# Patient Record
Sex: FEMALE | Race: WHITE | NOT HISPANIC OR LATINO | Employment: OTHER | URBAN - METROPOLITAN AREA
[De-identification: names, ages, dates, MRNs, and addresses within clinical notes are randomized per-mention and may not be internally consistent; named-entity substitution may affect disease eponyms.]

---

## 2017-04-10 ENCOUNTER — HOSPITAL ENCOUNTER (EMERGENCY)
Facility: CLINIC | Age: 67
Discharge: HOME | End: 2017-04-10
Attending: EMERGENCY MEDICINE | Admitting: EMERGENCY MEDICINE
Payer: MEDICARE

## 2017-04-10 VITALS
RESPIRATION RATE: 16 BRPM | OXYGEN SATURATION: 98 % | DIASTOLIC BLOOD PRESSURE: 93 MMHG | SYSTOLIC BLOOD PRESSURE: 178 MMHG | TEMPERATURE: 98.3 F

## 2017-04-10 DIAGNOSIS — R00.2 PALPITATIONS: ICD-10-CM

## 2017-04-10 DIAGNOSIS — N30.00 ACUTE CYSTITIS WITHOUT HEMATURIA: ICD-10-CM

## 2017-04-10 LAB
ALBUMIN UR-MCNC: NEGATIVE MG/DL
ANION GAP SERPL CALCULATED.3IONS-SCNC: 5 MMOL/L (ref 3–14)
APPEARANCE UR: ABNORMAL
BACTERIA #/AREA URNS HPF: ABNORMAL /HPF
BASOPHILS # BLD AUTO: 0 10E9/L (ref 0–0.2)
BASOPHILS NFR BLD AUTO: 0.8 %
BILIRUB UR QL STRIP: NEGATIVE
BUN SERPL-MCNC: 11 MG/DL (ref 7–30)
CALCIUM SERPL-MCNC: 8.6 MG/DL (ref 8.5–10.1)
CHLORIDE SERPL-SCNC: 107 MMOL/L (ref 94–109)
CO2 SERPL-SCNC: 32 MMOL/L (ref 20–32)
COLOR UR AUTO: YELLOW
CREAT SERPL-MCNC: 0.58 MG/DL (ref 0.52–1.04)
DIFFERENTIAL METHOD BLD: NORMAL
EOSINOPHIL # BLD AUTO: 0.1 10E9/L (ref 0–0.7)
EOSINOPHIL NFR BLD AUTO: 1 %
ERYTHROCYTE [DISTWIDTH] IN BLOOD BY AUTOMATED COUNT: 13 % (ref 10–15)
GFR SERPL CREATININE-BSD FRML MDRD: ABNORMAL ML/MIN/1.7M2
GLUCOSE SERPL-MCNC: 71 MG/DL (ref 70–99)
GLUCOSE UR STRIP-MCNC: NEGATIVE MG/DL
HCT VFR BLD AUTO: 39.7 % (ref 35–47)
HGB BLD-MCNC: 13.6 G/DL (ref 11.7–15.7)
HGB UR QL STRIP: ABNORMAL
HYALINE CASTS #/AREA URNS LPF: 1 /LPF (ref 0–2)
IMM GRANULOCYTES # BLD: 0 10E9/L (ref 0–0.4)
IMM GRANULOCYTES NFR BLD: 0 %
INTERPRETATION ECG - MUSE: NORMAL
KETONES UR STRIP-MCNC: NEGATIVE MG/DL
LEUKOCYTE ESTERASE UR QL STRIP: ABNORMAL
LYMPHOCYTES # BLD AUTO: 1.1 10E9/L (ref 0.8–5.3)
LYMPHOCYTES NFR BLD AUTO: 21.3 %
MAGNESIUM SERPL-MCNC: 2.1 MG/DL (ref 1.6–2.3)
MCH RBC QN AUTO: 30.9 PG (ref 26.5–33)
MCHC RBC AUTO-ENTMCNC: 34.3 G/DL (ref 31.5–36.5)
MCV RBC AUTO: 90 FL (ref 78–100)
MONOCYTES # BLD AUTO: 0.4 10E9/L (ref 0–1.3)
MONOCYTES NFR BLD AUTO: 6.9 %
MUCOUS THREADS #/AREA URNS LPF: PRESENT /LPF
NEUTROPHILS # BLD AUTO: 3.6 10E9/L (ref 1.6–8.3)
NEUTROPHILS NFR BLD AUTO: 70 %
NITRATE UR QL: NEGATIVE
NRBC # BLD AUTO: 0 10*3/UL
NRBC BLD AUTO-RTO: 0 /100
PH UR STRIP: 5 PH (ref 5–7)
PLATELET # BLD AUTO: 229 10E9/L (ref 150–450)
POTASSIUM SERPL-SCNC: 3.2 MMOL/L (ref 3.4–5.3)
RBC # BLD AUTO: 4.4 10E12/L (ref 3.8–5.2)
RBC #/AREA URNS AUTO: 3 /HPF (ref 0–2)
SODIUM SERPL-SCNC: 144 MMOL/L (ref 133–144)
SP GR UR STRIP: 1.01 (ref 1–1.03)
SQUAMOUS #/AREA URNS AUTO: 2 /HPF (ref 0–1)
T4 FREE SERPL-MCNC: 1.13 NG/DL (ref 0.76–1.46)
TSH SERPL DL<=0.005 MIU/L-ACNC: 0.22 MU/L (ref 0.4–4)
URN SPEC COLLECT METH UR: ABNORMAL
UROBILINOGEN UR STRIP-MCNC: 0 MG/DL (ref 0–2)
WBC # BLD AUTO: 5.1 10E9/L (ref 4–11)
WBC #/AREA URNS AUTO: 11 /HPF (ref 0–2)

## 2017-04-10 PROCEDURE — 85025 COMPLETE CBC W/AUTO DIFF WBC: CPT | Performed by: EMERGENCY MEDICINE

## 2017-04-10 PROCEDURE — 80048 BASIC METABOLIC PNL TOTAL CA: CPT | Performed by: EMERGENCY MEDICINE

## 2017-04-10 PROCEDURE — 84439 ASSAY OF FREE THYROXINE: CPT | Performed by: EMERGENCY MEDICINE

## 2017-04-10 PROCEDURE — 99284 EMERGENCY DEPT VISIT MOD MDM: CPT

## 2017-04-10 PROCEDURE — 83735 ASSAY OF MAGNESIUM: CPT | Performed by: EMERGENCY MEDICINE

## 2017-04-10 PROCEDURE — 36415 COLL VENOUS BLD VENIPUNCTURE: CPT | Performed by: EMERGENCY MEDICINE

## 2017-04-10 PROCEDURE — 81001 URINALYSIS AUTO W/SCOPE: CPT | Performed by: EMERGENCY MEDICINE

## 2017-04-10 PROCEDURE — 93005 ELECTROCARDIOGRAM TRACING: CPT

## 2017-04-10 PROCEDURE — 87086 URINE CULTURE/COLONY COUNT: CPT | Performed by: EMERGENCY MEDICINE

## 2017-04-10 PROCEDURE — 84443 ASSAY THYROID STIM HORMONE: CPT | Performed by: EMERGENCY MEDICINE

## 2017-04-10 RX ORDER — SULFAMETHOXAZOLE/TRIMETHOPRIM 800-160 MG
1 TABLET ORAL 2 TIMES DAILY
Qty: 6 TABLET | Refills: 0 | Status: SHIPPED | OUTPATIENT
Start: 2017-04-10 | End: 2017-04-13

## 2017-04-10 ASSESSMENT — ENCOUNTER SYMPTOMS
FREQUENCY: 1
PALPITATIONS: 1

## 2017-04-10 NOTE — ED PROVIDER NOTES
"  History     Chief Complaint:    Palpitations      HPI   Sharona Hardwick is a 66 year old female with a history, CAD, hypertension, hyperlipidemia, anxiety with somatization disorder (with frequent ER visits and workup for symptoms attributed to her anxiety) who presents with palpitations. The patient has had a Holter monitor as recently as 7 years ago. She states that she was seen by cardiology a couple weeks ago as she reports that she has had instances when her \"heart stops\" and she \"cannot feel a pulse\". She states that cardiology \"did not do anything\" on that visit and she returned home. This has been present for the last month, and she states that it has happened more frequently after her cardiology visit, especially after she smokes a cigarette.  Denies any other associated symptoms including lightheadedness, near-syncope or syncope.  The patient also reports that she has had increased urinary frequency and that her urine feels \"cold\". She has not had any changes in her medications recently and states that she takes her current regimen as directed.      Cardiac Risk Factors   Sex: Male   Tobacco: Positive  Hypertension: Positive   Diabetes: Negative  Hyperlipidemia: Positive   Family History: Positive    Allergies:  Albuterol   Beta Adrenergic Blockers  Nitrofurantoin  Spironolactone  Ciprofloxacin  Sulfamethoxazole W/Trimethoprim  Tavist-D [[employer] Dayhist-ROB]    Medications:    Aspirin 81 mg tablet  Sennosides (senokot) 8.6 mg tablet  Docusate sodium (colace) 100 mg capsule  Lorazepam po  Vitamin d, cholecalciferol, po  Nitroglycerin (nitrostat) 0.4 mg sl tablet  Oxycodone (roxicodone) 15 mg immediate release tablet  Lisinopril (prinivil,zestril) 20 mg tablet  Simvastatin (zocor) 20 mg tablet    Past Medical History:    Anxiety with somatization  Chronic kidney disease  Coronary artery disease   Colon polyps   COPD (chronic obstructive pulmonary disease) (H)   Coronary artery disease   HTN " "(hypertension)   Hyperlipidemia LDL goal < 70   Neuropathy (H)   JONH (obstructive sleep apnea)   Peripheral vascular disease (H)   Urinary retention     Past Surgical History:    GI surgery   GYN surgery   Orthopedic surgery    Family History:    CAD - Father, Sister   Hypertension - Father   Cerebrovascular disease - Father   Prostate cancer - Father, Brother    Obesity - Brother   Diabetes - Sister     Social History:  Marital Status:   Presents to the ED alone  Tobacco Use: Current everyday smoker  Alcohol Use: No  PCP: Ele Gerard MD      Review of Systems   Cardiovascular: Positive for palpitations.   Genitourinary: Positive for frequency.        Positive for \"cold\" urine.    All other systems reviewed and are negative.        Physical Exam   First Vitals:  BP: (!) 178/93  Heart Rate: 61  Temp: 98.3  F (36.8  C)  Resp: 16  SpO2: 98 %    Physical Exam    VITAL SIGNS: BP (!) 178/93  Temp 98.3  F (36.8  C) (Temporal)  Resp 16  SpO2 98%   Constitutional:  Well developed, Well nourished, Anxious   HENT:  Bilateral external ears normal, Mucous membranes moist, Nose normal. Neck- Normal range of motion, Supple  Respiratory:  Normal breath sounds, No respiratory distress, No wheezing,  Cardiovascular:  Mild bradycardia with heart rate in the 60's, Normal rhythm, No murmurs,    Musculoskeletal:  Intact distal pulses, No edema, grossly unremarkable range of motion   Integument:  Warm, Dry   Neurologic:  Alert, attentive and appropriately oriented  Psychiatric:  Mood and affect anxious      Emergency Department Course   ECG:  @ 1158  Indication: palpitations  Vent. Rate 51 bpm. AL interval 164 ms. QRS duration 88 ms. QT/QTc 460/423 ms. P-R-T axis 80 18 59.   Sinus bradycardia, otherwise normal ECG.  No significant change when compared to previous ECG from 9/28/15   Read @ 1202 by Dr. Kwok.     Laboratory:  UA: Slightly cloudy yellow urine, blood moderate, leukocyte esterase large, RBC 3 (H), WBC 11 (H), " Bacteria few, Squamous Epithelial / HPF 2 (H), mucous present otherwise WNL     Urine Culture: pending     BMP: Potassium 3.2 (L), otherwise WNL (Creatinine 0.58)     CBC:  WBC 5.1, HGB 13.6, , otherwise WNL     Magnesium: 2.1    TSH with T 4 reflex: 0.22 (L)    T4 free: 1.13    Emergency Department Course:  Nursing notes and vitals reviewed.  I performed an exam of the patient as documented above.   EKG was done, interpretation as above.  IV inserted.   Blood was drawn from the patient. This was sent for laboratory testing, findings above.   Urine sample was obtained and sent for laboratory analysis, findings above.  (1355) I rechecked on the patient.   (1401) I rechecked on the patient. We discussed antibiotics and she does not recall having any allergy to Bactrim and wishes to be discharged home on this. She declines having the first dose here.   Findings and plan explained to the patient. Patient discharged home with instructions regarding supportive care, medications, and reasons to return. The importance of close follow-up was reviewed. The patient was prescribed bactrim.   I personally reviewed the laboratory results with the patient and answered all related questions prior to discharge.        Impression & Plan      Medical Decision Making:  Sharona Hardwick is a 66 year old female who presents for evaluation of palpitations.  Initial ECG shows only sinus bradycardia.  A broad differential diagnosis was considered including Atrial fibrillation, ventricular arrhythmia, thyroid disease, acute electrolyte abnormality,  drugs/medications, anemia, heart disease, PE, etc.  she has subjectively felt symptoms while here but no alarms on her cardiac monitoring were noted.  The workup and exam here in ED would indicate that supportive outpatient management is indicated.  Doubt acute coronary syndrome given symptoms and exam.  Will have them follow up with cardiology if symptoms persist.      This patient also  had a vague symptoms possibly consistent with a urinary tract infection.  Urinalysis does reflect an infection.  There has been no fever, back/flank pain or significant abdominal pain.  There is no clinical evidence of pyelonephritis, appendicitis,  or diverticulitis.  The patient will be started on bactrim for the infection.  She has multiple medication allergies, but I reviewed these in detail with her; she does not recall any reaction to Bactrim and denies specifically any rash, itching, or throat swelling.  She requested to be prescribed this and declined having a first dose here as a precaution.  Return if increasing pain, vomiting, fever, or inability to tolerate the oral antibiotic.  Follow up with primary physician is indicated if not improving in 2-3 days.  Overall, she agreed with plan.      Diagnosis:    ICD-10-CM    1. Acute cystitis without hematuria N30.00    2. Palpitations R00.2      Disposition:  Discharge to home.     Discharge Medications:  Discharge Medication List as of 4/10/2017  2:06 PM      START taking these medications    Details   sulfamethoxazole-trimethoprim (BACTRIM DS) 800-160 MG per tablet Take 1 tablet by mouth 2 times daily for 3 days, Disp-6 tablet, R-0, Local Print             Rafael ROMERO, am serving as a scribe on 4/10/2017 at 12:12 PM to personally document services performed by Dr. Kwok based on my observations and the provider's statements to me.     4/10/2017   [facility] EMERGENCY DEPARTMENT       Sole Kwok MD  04/10/17 8702

## 2017-04-10 NOTE — ED AVS SNAPSHOT
[facility] Emergency Department    [address] [place] [address]    Phone:  [phone]    Fax:  [phone]                                       hSarona Hardwick   MRN: [id number]    Department:  [facility] Emergency Department   Date of Visit:  4/10/2017           Patient Information     Date Of Birth          1950        Your diagnoses for this visit were:     Acute cystitis without hematuria     Palpitations        You were seen by Sole Kwok MD.      Follow-up Information     Follow up with Ele Gerard MD. Schedule an appointment as soon as possible for a visit in 1 day.    Specialty:  Family Practice    Contact information:    [facility]  [address]  [facility] [address]  [phone]          Discharge Instructions         Understanding Urinary Tract Infections (UTIs)  Most UTIs are caused by bacteria, although they may also be caused by viruses or fungi. Bacteria from the bowel are the most common source of infection. The infection may begin because of any of the following:    Sexual activity. During sex, germs can travel from the penis, vagina, or rectum into the urethra.     Germs on the skin outside the rectum may travel into the urethra. This is more common in women since the rectum and urethra are closer to each other than in men. Wiping from front to back after using the toilet and keeping the area clean can help prevent germs from getting to the urethra.    Blockage of urine flow through the urinary tract. If urine sits too long, germs may begin to grow out of control.      Parts of the urinary tract  The infection can occur in any part of the urinary tract.    The kidneys collect and store urine.    The ureters carry urine from the kidneys to the bladder.    The bladder holds urine until you are ready to let it out.    The urethra carries urine from the bladder out of the body. It is shorter in women, so bacteria  "can move through it more easily. The urethra is longer in men, so a UTI is less likely to reach the bladder or kidneys in men.    0908-7336 [employer]. [address] [place] [address]289. All rights reserved. This information is not intended as a substitute for professional medical care. Always follow your healthcare professional's instructions.        * Heart Palpitations    Palpitations refers to the feeling that your heart is beating hard, fast or irregular. Some people describe it as \"pounding\" or \"skipped beats\". Palpitations may occur in persons with heart disease, but can also occur in healthy persons. Your doctor does not believe that anything dangerous is causing your symptoms at this time.  Heart-Related Causes:    Arrhythmia (a change from the heart's normal rhythm)    Disease of the heart valves  Non-Heart-Related Causes:    Certain medicines (such as asthma inhalers and decongestants)    Some herbal supplements, energy drinks and pills, and weight loss pills    Illegal stimulant drugs (such as cocaine, crank, methamphetamine, PCP)    Caffeine, alcohol and tobacco    Medical conditions such as thyroid disease, anemia, anxiety and panic disorder  Sometimes the cause cannot be found.  Home Care:  1. Avoid excess caffeine, alcohol, tobacco and any stimulant drugs.  2. Tell your doctor about any prescription or over-the-counter or herbal medicines you take.  Follow Up  with your doctor or as advised by our staff.  Get Prompt Medical Attention  if any of the following occur together with palpitations:    Weakness, dizziness, light-headed or fainting    Chest pain or shortness of breath    Rapid heart rate (over 120 beats per minute, at rest)    Palpitations that lasts over 20 minutes    Weakness of an arm or leg or one side of the face    Difficulty with speech or vision    1531-7441 [employer]. [address] [place] [address]26. All rights reserved. This " information is not intended as a substitute for professional medical care. Always follow your healthcare professional's instructions.      Hypokalemia  Hypokalemia means a low level of potassium in the blood. This most often occurs in patients who take diuretics (water pills). It can also occur due to severe vomiting or diarrhea.  It is also seen in people who take laxatives for long periods of time.  A mild case usually causes no symptoms. It is only found with blood testing. More severe potassium loss causes generalized weakness, muscle or abdominal cramping, heart palpitations (rapid or irregular heartbeats) and low blood pressure.  Home care    Take any potassium supplements prescribed.    Eat foods rich in potassium. The highest amount is found in artichoke, baked potatoes, spinach, cantaloupe, honeydew melon, cod, halibut, salmon, and scallops. White, red, or pierre beans are also very good sources. A modest amount is found in orange juice, bananas, carrots, and tomato juice.    If you take certain types of diuretics, you will also need to take potassium supplements. If take a diuretic, discuss potassium supplements with your doctor.  Follow-up care  Follow up with your healthcare provider for a repeat blood test within the next week or as advised by our staff.  When to seek medical advice  Call your healthcare provider if any of the following occur:    Increased weakness, fatigue, muscle cramps    Dizziness  Call 911  Call 911 if any of the following occur:    Irregular heartbeat, extra beats or very fast heart rate    Loss of consciousness    [phone] [employer]. [address] [place] [address], [place] [address]. All rights reserved. This information is not intended as a substitute for professional medical care. Always follow your healthcare professional's instructions.              24 Hour Appointment Hotline       To make an appointment at any [facility], call [phone] ([phone]).  If you don't have a family doctor or clinic, we will help you find one. [facility] are conveniently located to serve the needs of you and your family.             Review of your medicines      START taking        Dose / Directions Last dose taken    sulfamethoxazole-trimethoprim 800-160 MG per tablet   Commonly known as:  BACTRIM DS   Dose:  1 tablet   Quantity:  6 tablet        Take 1 tablet by mouth 2 times daily for 3 days   Refills:  0          Our records show that you are taking the medicines listed below. If these are incorrect, please call your family doctor or clinic.        Dose / Directions Last dose taken    aspirin 81 MG tablet   Dose:  81 mg        Take 81 mg by mouth daily   Refills:  0        docusate sodium 100 MG capsule   Commonly known as:  COLACE   Dose:  100 mg        Take 100 mg by mouth daily   Refills:  0        lisinopril 20 MG tablet   Commonly known as:  PRINIVIL/ZESTRIL   Dose:  20 mg   Quantity:  90 tablet        Take 1 tablet (20 mg) by mouth daily   Refills:  3        LORAZEPAM PO        Take by mouth 2 times daily   Refills:  0        nitroglycerin 0.4 MG sublingual tablet   Commonly known as:  NITROSTAT   Dose:  0.4 mg        Place 1 tablet (0.4 mg) under the tongue every 5 minutes as needed for chest pain If you are still having symptoms after 3 doses (15 minutes) call 911.   Refills:  0        oxyCODONE 15 MG IR tablet   Commonly known as:  ROXICODONE   Dose:  15 mg        Take 15 mg by mouth 5 tablets daily   Refills:  0        sennosides 8.6 MG tablet   Commonly known as:  SENOKOT   Dose:  1 tablet        Take 1 tablet by mouth 2 times daily   Refills:  0        simvastatin 20 MG tablet   Commonly known as:  ZOCOR   Dose:  20 mg        Take 1 tablet (20 mg) by mouth At Bedtime   Refills:  0        VITAMIN D (CHOLECALCIFEROL) PO        Take by mouth daily   Refills:  0                Prescriptions were sent or printed at these locations (1 Prescription)                    Other Prescriptions                Printed at Department/Unit printer (1 of 1)         sulfamethoxazole-trimethoprim (BACTRIM DS) 800-160 MG per tablet                Procedures and tests performed during your visit     Basic metabolic panel    CBC with platelets differential    EKG 12-lead, tracing only    Magnesium    T4 free    TSH with free T4 reflex    UA reflex to Microscopic and Culture    Urine Culture Aerobic Bacterial      Orders Needing Specimen Collection     None      Pending Results     Date and Time Order Name Status Description    4/10/2017 1245 Urine Culture Aerobic Bacterial In process             Pending Culture Results     Date and Time Order Name Status Description    4/10/2017 1245 Urine Culture Aerobic Bacterial In process             Test Results From Your Hospital Stay        4/10/2017  1:24 PM      Component Results     Component Value Ref Range & Units Status    Color Urine Yellow  Final    Appearance Urine Slightly Cloudy  Final    Glucose Urine Negative NEG mg/dL Final    Bilirubin Urine Negative NEG Final    Ketones Urine Negative NEG mg/dL Final    Specific Gravity Urine 1.014 1.003 - 1.035 Final    Blood Urine Moderate (A) NEG Final    pH Urine 5.0 5.0 - 7.0 pH Final    Protein Albumin Urine Negative NEG mg/dL Final    Urobilinogen mg/dL 0.0 0.0 - 2.0 mg/dL Final    Nitrite Urine Negative NEG Final    Leukocyte Esterase Urine Large (A) NEG Final    Source Midstream Urine  Final    RBC Urine 3 (H) 0 - 2 /HPF Final    WBC Urine 11 (H) 0 - 2 /HPF Final    Bacteria Urine Few (A) NEG /HPF Final    Squamous Epithelial /HPF Urine 2 (H) 0 - 1 /HPF Final    Mucous Urine Present (A) NEG /LPF Final    Hyaline Casts 1 0 - 2 /LPF Final         4/10/2017  1:09 PM      Component Results     Component Value Ref Range & Units Status    Sodium 144 133 - 144 mmol/L Final    Potassium 3.2 (L) 3.4 - 5.3 mmol/L Final    Chloride 107 94 - 109 mmol/L Final    Carbon Dioxide 32 20 - 32 mmol/L Final    Anion  Gap 5 3 - 14 mmol/L Final    Glucose 71 70 - 99 mg/dL Final    Urea Nitrogen 11 7 - 30 mg/dL Final    Creatinine 0.58 0.52 - 1.04 mg/dL Final    GFR Estimate >90  Non  GFR Calc   >60 mL/min/1.7m2 Final    GFR Estimate If Black >90   GFR Calc   >60 mL/min/1.7m2 Final    Calcium 8.6 8.5 - 10.1 mg/dL Final         4/10/2017 12:48 PM      Component Results     Component Value Ref Range & Units Status    WBC 5.1 4.0 - 11.0 10e9/L Final    RBC Count 4.40 3.8 - 5.2 10e12/L Final    Hemoglobin 13.6 11.7 - 15.7 g/dL Final    Hematocrit 39.7 35.0 - 47.0 % Final    MCV 90 78 - 100 fl Final    MCH 30.9 26.5 - 33.0 pg Final    MCHC 34.3 31.5 - 36.5 g/dL Final    RDW 13.0 10.0 - 15.0 % Final    Platelet Count 229 150 - 450 10e9/L Final    Diff Method Automated Method  Final    % Neutrophils 70.0 % Final    % Lymphocytes 21.3 % Final    % Monocytes 6.9 % Final    % Eosinophils 1.0 % Final    % Basophils 0.8 % Final    % Immature Granulocytes 0.0 % Final    Nucleated RBCs 0 0 /100 Final    Absolute Neutrophil 3.6 1.6 - 8.3 10e9/L Final    Absolute Lymphocytes 1.1 0.8 - 5.3 10e9/L Final    Absolute Monocytes 0.4 0.0 - 1.3 10e9/L Final    Absolute Eosinophils 0.1 0.0 - 0.7 10e9/L Final    Absolute Basophils 0.0 0.0 - 0.2 10e9/L Final    Abs Immature Granulocytes 0.0 0 - 0.4 10e9/L Final    Absolute Nucleated RBC 0.0  Final         4/10/2017  1:09 PM      Component Results     Component Value Ref Range & Units Status    Magnesium 2.1 1.6 - 2.3 mg/dL Final         4/10/2017  1:15 PM      Component Results     Component Value Ref Range & Units Status    TSH 0.22 (L) 0.40 - 4.00 mU/L Final         4/10/2017  1:27 PM      Component Results     Component Value Ref Range & Units Status    T4 Free 1.13 0.76 - 1.46 ng/dL Final         4/10/2017  1:40 PM                Clinical Quality Measure: Blood Pressure Screening     Your blood pressure was checked while you were in the emergency department today. The last  "reading we obtained was  BP: (!) 178/93 . Please read the guidelines below about what these numbers mean and what you should do about them.  If your systolic blood pressure (the top number) is less than 120 and your diastolic blood pressure (the bottom number) is less than 80, then your blood pressure is normal. There is nothing more that you need to do about it.  If your systolic blood pressure (the top number) is 120-139 or your diastolic blood pressure (the bottom number) is 80-89, your blood pressure may be higher than it should be. You should have your blood pressure rechecked within a year by a primary care provider.  If your systolic blood pressure (the top number) is 140 or greater or your diastolic blood pressure (the bottom number) is 90 or greater, you may have high blood pressure. High blood pressure is treatable, but if left untreated over time it can put you at risk for heart attack, stroke, or kidney failure. You should have your blood pressure rechecked by a primary care provider within the next 4 weeks.  If your provider in the emergency department today gave you specific instructions to follow-up with your doctor or provider even sooner than that, you should follow that instruction and not wait for up to 4 weeks for your follow-up visit.        Thank you for choosing [facility]       Thank you for choosing [facility] for your care. Our goal is always to provide you with excellent care. Hearing back from our patients is one way we can continue to improve our services. Please take a few minutes to complete the written survey that you may receive in the mail after you visit with us. Thank you!        [email or web address] Information     [employer] lets you send messages to your doctor, view your test results, renew your prescriptions, schedule appointments and more. To sign up, go to [email or web address] . Click on \"Log in\" on the left side of the screen, which will take you to the Welcome page. Then click on \"Sign " "up Now\" on the right side of the page.     You will be asked to enter the access code listed below, as well as some personal information. Please follow the directions to create your username and password.     Your access code is: [email or web address]  Expires: 2017  2:06 PM     Your access code will  in 90 days. If you need help or a new code, please call your [facility] clinic or [phone].        Care EveryWhere ID     This is your Care EveryWhere ID. This could be used by other organizations to access your [facility] medical records  [id number]        After Visit Summary       This is your record. Keep this with you and show to your community pharmacist(s) and doctor(s) at your next visit.                  "

## 2017-04-10 NOTE — ED NOTES
"\"I think it has to do with my kidneys\". \"My heart stops\". \"I've been urinating a whole bunch\". States she checks for a pulse but she doesn't have one and lasts for a few seconds. States this has been having for the past month but \"it's getting worse\". States she did talk to her doctor about this before today, but \"they didn't check out my kidneys\". \"They're cold, my back and butt are cold\".    Pt is alert and oriented at triage. Respirations are even and easy. Skin is pink, warm and dry.   "

## 2017-04-10 NOTE — ED AVS SNAPSHOT
[facility] Emergency Department    [address]    [facility] [address]    Phone:  [phone]    Fax:  [phone]                                       Sharona Hardwick   MRN: [id number]    Department:  [facility] Emergency Department   Date of Visit:  4/10/2017           After Visit Summary Signature Page     I have received my discharge instructions, and my questions have been answered. I have discussed any challenges I see with this plan with the nurse or doctor.    ..........................................................................................................................................  Patient/Patient Representative Signature      ..........................................................................................................................................  Patient Representative Print Name and Relationship to Patient    ..................................................               ................................................  Date                                            Time    ..........................................................................................................................................  Reviewed by Signature/Title    ...................................................              ..............................................  Date                                                            Time

## 2017-04-10 NOTE — DISCHARGE INSTRUCTIONS
"  Understanding Urinary Tract Infections (UTIs)  Most UTIs are caused by bacteria, although they may also be caused by viruses or fungi. Bacteria from the bowel are the most common source of infection. The infection may begin because of any of the following:    Sexual activity. During sex, germs can travel from the penis, vagina, or rectum into the urethra.     Germs on the skin outside the rectum may travel into the urethra. This is more common in women since the rectum and urethra are closer to each other than in men. Wiping from front to back after using the toilet and keeping the area clean can help prevent germs from getting to the urethra.    Blockage of urine flow through the urinary tract. If urine sits too long, germs may begin to grow out of control.      Parts of the urinary tract  The infection can occur in any part of the urinary tract.    The kidneys collect and store urine.    The ureters carry urine from the kidneys to the bladder.    The bladder holds urine until you are ready to let it out.    The urethra carries urine from the bladder out of the body. It is shorter in women, so bacteria can move through it more easily. The urethra is longer in men, so a UTI is less likely to reach the bladder or kidneys in men.    1842-5939 [employer]. [address] [place] [address]. All rights reserved. This information is not intended as a substitute for professional medical care. Always follow your healthcare professional's instructions.        * Heart Palpitations    Palpitations refers to the feeling that your heart is beating hard, fast or irregular. Some people describe it as \"pounding\" or \"skipped beats\". Palpitations may occur in persons with heart disease, but can also occur in healthy persons. Your doctor does not believe that anything dangerous is causing your symptoms at this time.  Heart-Related Causes:    Arrhythmia (a change from the heart's normal rhythm)    Disease of " the heart valves  Non-Heart-Related Causes:    Certain medicines (such as asthma inhalers and decongestants)    Some herbal supplements, energy drinks and pills, and weight loss pills    Illegal stimulant drugs (such as cocaine, crank, methamphetamine, PCP)    Caffeine, alcohol and tobacco    Medical conditions such as thyroid disease, anemia, anxiety and panic disorder  Sometimes the cause cannot be found.  Home Care:  1. Avoid excess caffeine, alcohol, tobacco and any stimulant drugs.  2. Tell your doctor about any prescription or over-the-counter or herbal medicines you take.  Follow Up  with your doctor or as advised by our staff.  Get Prompt Medical Attention  if any of the following occur together with palpitations:    Weakness, dizziness, light-headed or fainting    Chest pain or shortness of breath    Rapid heart rate (over 120 beats per minute, at rest)    Palpitations that lasts over 20 minutes    Weakness of an arm or leg or one side of the face    Difficulty with speech or vision    [id number] [employer]. [address] [place] [address]. All rights reserved. This information is not intended as a substitute for professional medical care. Always follow your healthcare professional's instructions.      Hypokalemia  Hypokalemia means a low level of potassium in the blood. This most often occurs in patients who take diuretics (water pills). It can also occur due to severe vomiting or diarrhea.  It is also seen in people who take laxatives for long periods of time.  A mild case usually causes no symptoms. It is only found with blood testing. More severe potassium loss causes generalized weakness, muscle or abdominal cramping, heart palpitations (rapid or irregular heartbeats) and low blood pressure.  Home care    Take any potassium supplements prescribed.    Eat foods rich in potassium. The highest amount is found in artichoke, baked potatoes, spinach, cantaloupe, honeydew melon, cod,  halibut, salmon, and scallops. White, red, or pierre beans are also very good sources. A modest amount is found in orange juice, bananas, carrots, and tomato juice.    If you take certain types of diuretics, you will also need to take potassium supplements. If take a diuretic, discuss potassium supplements with your doctor.  Follow-up care  Follow up with your healthcare provider for a repeat blood test within the next week or as advised by our staff.  When to seek medical advice  Call your healthcare provider if any of the following occur:    Increased weakness, fatigue, muscle cramps    Dizziness  Call 911  Call 911 if any of the following occur:    Irregular heartbeat, extra beats or very fast heart rate    Loss of consciousness    [id number] [employer]. [address] [place] [address], [place] [address]. All rights reserved. This information is not intended as a substitute for professional medical care. Always follow your healthcare professional's instructions.

## 2017-04-11 LAB
BACTERIA SPEC CULT: NORMAL
Lab: NORMAL
MICRO REPORT STATUS: NORMAL
SPECIMEN SOURCE: NORMAL

## 2021-06-05 ENCOUNTER — RECORDS - HEALTHEAST (OUTPATIENT)
Dept: VASCULAR SURGERY | Facility: CLINIC | Age: 71
End: 2021-06-05